# Patient Record
Sex: MALE | Race: AMERICAN INDIAN OR ALASKA NATIVE | NOT HISPANIC OR LATINO | URBAN - METROPOLITAN AREA
[De-identification: names, ages, dates, MRNs, and addresses within clinical notes are randomized per-mention and may not be internally consistent; named-entity substitution may affect disease eponyms.]

---

## 2019-04-18 ASSESSMENT — MIFFLIN-ST. JEOR
SCORE: 2396.21
SCORE: 2373.53

## 2019-04-22 ENCOUNTER — ANESTHESIA - HEALTHEAST (OUTPATIENT)
Dept: SURGERY | Facility: HOSPITAL | Age: 49
End: 2019-04-22

## 2019-04-23 ENCOUNTER — SURGERY - HEALTHEAST (OUTPATIENT)
Dept: SURGERY | Facility: HOSPITAL | Age: 49
End: 2019-04-23

## 2019-04-23 ASSESSMENT — MIFFLIN-ST. JEOR: SCORE: 2359.92

## 2021-05-28 NOTE — ANESTHESIA POSTPROCEDURE EVALUATION
Patient: Herminio Hopkins  ROBOTIC ASSISTED RADICAL RETROPUBIC PROSTATECTOMY BILATERAL PELVIC LYMPH NODE DISSECTION  Anesthesia type: general    Patient location: PACU  Last vitals:   Vitals Value Taken Time   /68 4/23/2019 12:50 PM   Temp 37.2  C (99  F) 4/23/2019 11:45 AM   Pulse 80 4/23/2019 12:57 PM   Resp 19 4/23/2019 12:57 PM   SpO2 96 % 4/23/2019 12:57 PM   Pt seen at time indicated, note is a late entry.  Post vital signs: stable  Level of consciousness: awake and responds to simple questions  Post-anesthesia pain: pain controlled  Post-anesthesia nausea and vomiting: no  Pulmonary: unassisted, return to baseline  Cardiovascular: stable and blood pressure at baseline  Hydration: adequate  Anesthetic events: no    QCDR Measures:  ASA# 11 - [place]-op Cardiac Arrest: ASA11B - Patient did NOT experience unanticipated cardiac arrest  ASA# 12 - [place]-op Mortality Rate: ASA12B - Patient did NOT die  ASA# 13 - PACU Re-Intubation Rate: ASA13B - Patient did NOT require a new airway mgmt  ASA# 10 - Composite Anes Safety: ASA10A - No serious adverse event    Additional Notes:

## 2021-05-28 NOTE — ANESTHESIA PREPROCEDURE EVALUATION
"Anesthesia Evaluation      Patient summary reviewed   No history of anesthetic complications     Airway   Mallampati: III  Neck ROM: full   Pulmonary - normal exam   (+) asthma  mild,  well controlled, sleep apnea on CPAP, ,                          Cardiovascular - normal exam  Exercise tolerance: > or = 4 METS  (+) hypertension, , hypercholesterolemia,      Neuro/Psych - negative ROS     Endo/Other    (+) obesity,   (-) no diabetes     GI/Hepatic/Renal    (+) GERD well controlled,        Other findings: Chart lists TIA, but no details and pt knows nothing about it.  Does not always use his CPAP, \"sometimes I forget\".    Cardiology note of 4/2/19 states stress test was negative and EF was 63%.  Has not taken prednisone in over a year.  Pre-diabetic, on metformin.  3/20/19 Hg 15.9.  K 4.1, GFR>60, HgA1c 6.1. Cold sore lower lip.      Dental - normal exam                        Anesthesia Plan  Planned anesthetic: general endotracheal  Have glidescope in room.  Ketamine after induction.    ASA 3   Induction: intravenous   Anesthetic plan and risks discussed with: patient  Anesthesia plan special considerations: increased risk of difficult airway, antiemetics,   Post-op plan: routine recovery          "

## 2021-05-28 NOTE — ANESTHESIA CARE TRANSFER NOTE
Last vitals:   Vitals:    04/23/19 1145   BP: (!) 171/92   Pulse: 87   Resp: 18   Temp: 37.2  C (99  F)   SpO2: 93%     Patient's level of consciousness is drowsy  Spontaneous respirations: yes  Maintains airway independently: yes  Dentition unchanged: yes  Oropharynx: oropharynx clear of all foreign objects    QCDR Measures:  ASA# 20 - Surgical Safety Checklist: WHO surgical safety checklist completed prior to induction    PQRS# 430 - Adult PONV Prevention: 4558F - Pt received => 2 anti-emetic agents (different classes) preop & intraop  ASA# 8 - Peds PONV Prevention: NA - Not pediatric patient, not GA or 2 or more risk factors NOT present  PQRS# 424 - [place]-op Temp Management: 4559F - At least one body temp DOCUMENTED => 35.5C or 95.9F within required timeframe  PQRS# 426 - PACU Transfer Protocol: - Transfer of care checklist used  ASA# 14 - Acute Post-op Pain: ASA14B - Patient did NOT experience pain >= 7 out of 10

## 2021-05-28 NOTE — ANESTHESIA POSTPROCEDURE EVALUATION
Patient: Herminio Hopkins  ROBOTIC ASSISTED RADICAL RETROPUBIC PROSTATECTOMY BILATERAL PELVIC LYMPH NODE DISSECTION  Anesthesia type: general    Patient location: [facility] Floor  Last vitals:   Vitals Value Taken Time   /58 4/24/2019 11:19 AM   Temp 36.6  C (97.8  F) 4/24/2019 11:19 AM   Pulse 72 4/24/2019 11:19 AM   Resp 18 4/24/2019 11:19 AM   SpO2 95 % 4/24/2019 11:19 AM     Subjective:   Called to assess the patient regarding pain / numbness distributed over his right anterolateral thigh. Endorses some dull ache sensation. Denies any other sensory loss or paresthesia in the LE. Denies associated muscle weakness.    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 Anticipate that he will improve with time. Discussed w/ pt and his wife that should he have worsening of symptoms or new changes that he should be reevaluated but at this point there is no need for a neurology consult or physical therapy. Pt is safe for discharge as he has no associated muscle weakness and is not an increased fall risk related to the nerve injury. I relayed this information to Dr. Serrano.    If you have any questions please contact the anesthesiologist on call at ext [phone].    Naif Gómez MD  Staff Anesthesiologist

## 2021-06-03 VITALS — HEIGHT: 76 IN | WEIGHT: 310.5 LBS | BODY MASS INDEX: 37.81 KG/M2

## 2021-07-03 NOTE — ADDENDUM NOTE
Addendum Note by Naif Gómez MD at 4/24/2019  2:33 PM     Author: Naif Gómez MD Service: -- Author Type: Physician    Filed: 4/24/2019  2:33 PM Date of Service: 4/24/2019  2:33 PM Status: Signed    : Naif Gómez MD (Physician)       Addendum  created 04/24/19 1433 by Naif Gómez MD    Sign clinical note